# Patient Record
Sex: FEMALE | Race: WHITE | NOT HISPANIC OR LATINO | URBAN - METROPOLITAN AREA
[De-identification: names, ages, dates, MRNs, and addresses within clinical notes are randomized per-mention and may not be internally consistent; named-entity substitution may affect disease eponyms.]

---

## 2023-07-11 ENCOUNTER — HOSPITAL ENCOUNTER (OUTPATIENT)
Dept: DATA CONVERSION | Facility: HOSPITAL | Age: 88
End: 2023-07-21
Attending: PHYSICAL MEDICINE & REHABILITATION

## 2023-07-12 LAB
ANION GAP IN SER/PLAS: 13 MMOL/L (ref 10–20)
ANION GAP IN SER/PLAS: NORMAL
CALCIUM (MG/DL) IN SER/PLAS: 8.6 MG/DL (ref 8.6–10.3)
CALCIUM (MG/DL) IN SER/PLAS: NORMAL
CARBON DIOXIDE, TOTAL (MMOL/L) IN SER/PLAS: 30 MMOL/L (ref 21–32)
CARBON DIOXIDE, TOTAL (MMOL/L) IN SER/PLAS: NORMAL
CHLORIDE (MMOL/L) IN SER/PLAS: 104 MMOL/L (ref 98–107)
CHLORIDE (MMOL/L) IN SER/PLAS: NORMAL
CREATININE (MG/DL) IN SER/PLAS: 1.5 MG/DL (ref 0.5–1.05)
CREATININE (MG/DL) IN SER/PLAS: NORMAL
ERYTHROCYTE DISTRIBUTION WIDTH (RATIO) BY AUTOMATED COUNT: 13.4 % (ref 11.5–14.5)
ERYTHROCYTE DISTRIBUTION WIDTH (RATIO) BY AUTOMATED COUNT: 13.4 % (ref 11.5–14.5)
ERYTHROCYTE DISTRIBUTION WIDTH (RATIO) BY AUTOMATED COUNT: NORMAL
ERYTHROCYTE MEAN CORPUSCULAR HEMOGLOBIN CONCENTRATION (G/DL) BY AUTOMATED: 31.5 G/DL (ref 32–36)
ERYTHROCYTE MEAN CORPUSCULAR HEMOGLOBIN CONCENTRATION (G/DL) BY AUTOMATED: 31.5 G/DL (ref 32–36)
ERYTHROCYTE MEAN CORPUSCULAR HEMOGLOBIN CONCENTRATION (G/DL) BY AUTOMATED: NORMAL
ERYTHROCYTE MEAN CORPUSCULAR VOLUME (FL) BY AUTOMATED COUNT: 100 FL (ref 80–100)
ERYTHROCYTE MEAN CORPUSCULAR VOLUME (FL) BY AUTOMATED COUNT: 100 FL (ref 80–100)
ERYTHROCYTE MEAN CORPUSCULAR VOLUME (FL) BY AUTOMATED COUNT: NORMAL
ERYTHROCYTES (10*6/UL) IN BLOOD BY AUTOMATED COUNT: 3.39 X10E12/L (ref 4–5.2)
ERYTHROCYTES (10*6/UL) IN BLOOD BY AUTOMATED COUNT: 3.39 X10E12/L (ref 4–5.2)
ERYTHROCYTES (10*6/UL) IN BLOOD BY AUTOMATED COUNT: NORMAL
GFR FEMALE: 33 ML/MIN/1.73M2
GFR FEMALE: NORMAL
GFR MALE: NORMAL
GLUCOSE (MG/DL) IN SER/PLAS: 82 MG/DL (ref 74–99)
GLUCOSE (MG/DL) IN SER/PLAS: NORMAL
HEMATOCRIT (%) IN BLOOD BY AUTOMATED COUNT: 34 % (ref 36–46)
HEMATOCRIT (%) IN BLOOD BY AUTOMATED COUNT: 34 % (ref 36–46)
HEMATOCRIT (%) IN BLOOD BY AUTOMATED COUNT: NORMAL
HEMOGLOBIN (G/DL) IN BLOOD: 10.7 G/DL (ref 12–16)
HEMOGLOBIN (G/DL) IN BLOOD: 10.7 G/DL (ref 12–16)
HEMOGLOBIN (G/DL) IN BLOOD: NORMAL
INR IN PPP BY COAGULATION ASSAY: 2.9 (ref 0.9–1.1)
LEUKOCYTES (10*3/UL) IN BLOOD BY AUTOMATED COUNT: 9.3 X10E9/L (ref 4.4–11.3)
LEUKOCYTES (10*3/UL) IN BLOOD BY AUTOMATED COUNT: 9.3 X10E9/L (ref 4.4–11.3)
LEUKOCYTES (10*3/UL) IN BLOOD BY AUTOMATED COUNT: NORMAL
NRBC (PER 100 WBCS) BY AUTOMATED COUNT: NORMAL
PLATELETS (10*3/UL) IN BLOOD AUTOMATED COUNT: 169 X10E9/L (ref 150–450)
PLATELETS (10*3/UL) IN BLOOD AUTOMATED COUNT: 169 X10E9/L (ref 150–450)
PLATELETS (10*3/UL) IN BLOOD AUTOMATED COUNT: NORMAL
POTASSIUM (MMOL/L) IN SER/PLAS: 4.2 MMOL/L (ref 3.5–5.3)
POTASSIUM (MMOL/L) IN SER/PLAS: NORMAL
PROTHROMBIN TIME (PT) IN PPP BY COAGULATION ASSAY: 32.7 SEC (ref 9.8–12.8)
SODIUM (MMOL/L) IN SER/PLAS: 143 MMOL/L (ref 136–145)
SODIUM (MMOL/L) IN SER/PLAS: NORMAL
UREA NITROGEN (MG/DL) IN SER/PLAS: 47 MG/DL (ref 6–23)
UREA NITROGEN (MG/DL) IN SER/PLAS: NORMAL

## 2023-07-17 LAB
ANION GAP IN SER/PLAS: 11 MMOL/L (ref 10–20)
BASOPHILS (10*3/UL) IN BLOOD BY AUTOMATED COUNT: 0.04 X10E9/L (ref 0–0.1)
BASOPHILS/100 LEUKOCYTES IN BLOOD BY AUTOMATED COUNT: 0.7 % (ref 0–2)
CALCIUM (MG/DL) IN SER/PLAS: 8.5 MG/DL (ref 8.6–10.3)
CARBON DIOXIDE, TOTAL (MMOL/L) IN SER/PLAS: 31 MMOL/L (ref 21–32)
CHLORIDE (MMOL/L) IN SER/PLAS: 105 MMOL/L (ref 98–107)
CREATININE (MG/DL) IN SER/PLAS: 1.67 MG/DL (ref 0.5–1.05)
EOSINOPHILS (10*3/UL) IN BLOOD BY AUTOMATED COUNT: 0.18 X10E9/L (ref 0–0.4)
EOSINOPHILS/100 LEUKOCYTES IN BLOOD BY AUTOMATED COUNT: 3 % (ref 0–6)
ERYTHROCYTE DISTRIBUTION WIDTH (RATIO) BY AUTOMATED COUNT: 13.3 % (ref 11.5–14.5)
ERYTHROCYTE MEAN CORPUSCULAR HEMOGLOBIN CONCENTRATION (G/DL) BY AUTOMATED: 30.5 G/DL (ref 32–36)
ERYTHROCYTE MEAN CORPUSCULAR VOLUME (FL) BY AUTOMATED COUNT: 104 FL (ref 80–100)
ERYTHROCYTES (10*6/UL) IN BLOOD BY AUTOMATED COUNT: 3 X10E12/L (ref 4–5.2)
GFR FEMALE: 29 ML/MIN/1.73M2
GLUCOSE (MG/DL) IN SER/PLAS: 81 MG/DL (ref 74–99)
HEMATOCRIT (%) IN BLOOD BY AUTOMATED COUNT: 31.1 % (ref 36–46)
HEMOGLOBIN (G/DL) IN BLOOD: 9.5 G/DL (ref 12–16)
IMMATURE GRANULOCYTES/100 LEUKOCYTES IN BLOOD BY AUTOMATED COUNT: 1 % (ref 0–0.9)
INR IN PPP BY COAGULATION ASSAY: 2.4 (ref 0.9–1.1)
LEUKOCYTES (10*3/UL) IN BLOOD BY AUTOMATED COUNT: 6 X10E9/L (ref 4.4–11.3)
LYMPHOCYTES (10*3/UL) IN BLOOD BY AUTOMATED COUNT: 0.78 X10E9/L (ref 0.8–3)
LYMPHOCYTES/100 LEUKOCYTES IN BLOOD BY AUTOMATED COUNT: 13.1 % (ref 13–44)
MONOCYTES (10*3/UL) IN BLOOD BY AUTOMATED COUNT: 0.77 X10E9/L (ref 0.05–0.8)
MONOCYTES/100 LEUKOCYTES IN BLOOD BY AUTOMATED COUNT: 12.9 % (ref 2–10)
NEUTROPHILS (10*3/UL) IN BLOOD BY AUTOMATED COUNT: 4.13 X10E9/L (ref 1.6–5.5)
NEUTROPHILS/100 LEUKOCYTES IN BLOOD BY AUTOMATED COUNT: 69.3 % (ref 40–80)
PLATELETS (10*3/UL) IN BLOOD AUTOMATED COUNT: 202 X10E9/L (ref 150–450)
POTASSIUM (MMOL/L) IN SER/PLAS: 4 MMOL/L (ref 3.5–5.3)
PROTHROMBIN TIME (PT) IN PPP BY COAGULATION ASSAY: 27.5 SEC (ref 9.8–12.8)
SODIUM (MMOL/L) IN SER/PLAS: 143 MMOL/L (ref 136–145)
UREA NITROGEN (MG/DL) IN SER/PLAS: 50 MG/DL (ref 6–23)

## 2025-01-08 ENCOUNTER — EVALUATION (OUTPATIENT)
Dept: OCCUPATIONAL THERAPY | Facility: CLINIC | Age: OVER 89
End: 2025-01-08
Payer: MEDICARE

## 2025-01-08 DIAGNOSIS — I89.0 LYMPHEDEMA, NOT ELSEWHERE CLASSIFIED: ICD-10-CM

## 2025-01-08 DIAGNOSIS — R29.898 LEG HEAVINESS: ICD-10-CM

## 2025-01-08 DIAGNOSIS — I89.0 LYMPHEDEMA OF BOTH LOWER EXTREMITIES: ICD-10-CM

## 2025-01-08 DIAGNOSIS — M25.671 ANKLE JOINT STIFFNESS, BILATERAL: Primary | ICD-10-CM

## 2025-01-08 DIAGNOSIS — R53.1 WEAKNESS GENERALIZED: ICD-10-CM

## 2025-01-08 DIAGNOSIS — M25.672 ANKLE JOINT STIFFNESS, BILATERAL: Primary | ICD-10-CM

## 2025-01-08 PROCEDURE — 97166 OT EVAL MOD COMPLEX 45 MIN: CPT | Mod: GO | Performed by: OCCUPATIONAL THERAPIST

## 2025-01-08 PROCEDURE — 97535 SELF CARE MNGMENT TRAINING: CPT | Mod: GO | Performed by: OCCUPATIONAL THERAPIST

## 2025-01-08 ASSESSMENT — ACTIVITIES OF DAILY LIVING (ADL): HOME_MANAGEMENT_TIME_ENTRY: 8

## 2025-01-08 NOTE — PROGRESS NOTES
Occupational Therapy    Evaluation    Patient Name: Amanda Fang  MRN: [id number]  Today's Date: 1/9/2025       Visit: 1    Assessment:   Amanda Fang presents to occupational therapy with complaint of BLE lymphedema all limiting independence with ADLs, functional mobility, IADLs, and leisure activities. Standardized testing and measures administered today reveal that the patient has multiple impairments in body structures and functions, activity limitations, and participation restrictions. The patient has personal factors and comorbidities that may serve as barriers affecting the plan of care, including reliance on transportation and history of nephrectomy. Skilled OT services are warranted in order to realize measurable change in the above outcome measures and achieve improvements in patient's functional status and individual goals. Pt verbalized understanding and is in agreement with goals and plan of care.     Plan:      1x/wk for 8-12 weeks   -Complete Decongestive Therapy to BLEs   -Manual Therapy, Therapeutic Exercise, Self Care/ADL      Subjective   Current Problem:  1. Ankle joint stiffness, bilateral        2. Lymphedema, not elsewhere classified  Referral to Occupational Therapy      3. Leg heaviness        4. Lymphedema of both lower extremities        5. Weakness generalized          Patient arrives to OT with chief complaint of BLE lymphedema, joint stiffness, skin texture changes and generalized weakness all impacting independence with ADLs, IADLs, functional mobility, work and leisure tasks.     Pt reports onset of BLEs swelling ~5 years ago, however has fluctuated throughout the years.  Patient reports swelling was worse last summer resulting in hospitalization.  During the hospitalization pt had diuresis legs and swelling improved at that time.   More recently, leg swelling has gradually worsened with intermittent weeping.   Pt associates pain with swelling in BLEs; describes pain as heavy  "and achy.  Patient reports swelling improves with elevation and worsens  with prolonged sitting or standing. Patient has not received lymphedema therapy in the past.  Patient does wear velcro compression garment intermittently at this time.  Pt's family has also wrapped legs with Ace bandages which was somewhat beneficial.      Pt lives with daughter in single story home   Pt ambulates with use of FWW     Per ED visit in July 2023:   Patient is a 89-year-old female with past medical history of previous nephrectomy, CHF who presents to the emergency department day due to concern for lower extremity edema, concern for cellulitis, and shortness of breath.  Patient has reportedly had  increased weight gain over the past week.  She also has had increased lower extremity edema and swelling.  They are also concerned that patient has a cellulitis on her right lower extremity.  They were seen by their outpatient provider Dr. Cabrera, who  started patient on acyclovir and Keflex for concern for possible shingles versus cellulitis of right lower extremity.  They do think that the leg is worsened somewhat.  Take patient denies any fevers, cough, chills, chest pain, abdominal pain.  She does  report some shortness of breath.     ROS patient agrees with cardiology statements for Malaise, ; Dry Cough, Shortness of Breath; Dyspnea on Exertion; Musculoskeletal Pain, Stiffness, Weakness,  Anxiety, Sleep Changes, Rash, cold intolerance, anemia. The cardiologist was partially correct  with \"fever\" as daughter tells me mom felt warm prior to ER, yet not since that lone episode, adding no temperature has been elevated (first check was in ER). Also, blur vision may mean \"wears glasses\" because today she states no eye issues.         During her hospitalization, she received treatment for fluid overflow. Consultation to nephrology was appreciated. Diuresis was more aggressive than what could occur as outpatient, in part to the extra " "monitoring of vitals, symptoms and labs when in the  hospital as opposed to at home.      -lymphedema: she has had this for years, but recently it has worsened. \"Acute\" is likely better referencing the \"aggressiveness of treatment\" and not the \"time of onset\" for her issue. The referral to lymphedema clinic will continue post-hospital, as  planned. Nephrology and I are in agreement that as an inpatient she may be fluid-positive with aggressive diuresis, and will leave the hospital less swollen. The risks for removing the fluid are mitigable in the hospital, safer than as outpatient treatment.     -\"acute\" diastolic heart failure with volume overload. Cardiologist defers to nephrologist about compression hose, about diuresis. No concerning cardiac issues at this time. Cardiology signs off. Please call or reconsult if any problems or concerns.   -CAD: Hx angioplasty to coronary artery (long ago) is proof for such. Dr Galindo is her usual cardiologist, and he does not attend at this institution. Dr Ford kindly saw the patient.   - atrial fib is noted as rate-controlled.   -anticoagulation with warfarin ([facility] coumadin clinic monitoring when not in hospital).  Labs showed supra-therapeutic INR. Will supplement accordingly.   -hyperlipidemia and takes statin daily.   -rash uncertain cause, and resolved.  -hx unilateral nephrectomy.   -Stage IV CRF: three times in 12 months that the outpatient labs have creatinine clearance estimated in the 25-30 cc/min range.  creatinine level has been 1.8 to 1.9 four of last five times since Sept 2021. Dr. Horn has in person spoken to me about  the advantages of in-patient treatment She is now allowed aggressive diuresis. There is better supervision with nursing checking in on her to ask for side effect of treatment or new symptoms, with labs available on-demand, and with vitals performed multiple  times daily.     Precautions:   No history of wounds of DVT    CHF and nephrectomy "     Pain:   3/10 at rest   7/10 at worst     Objective       Lymphedema Assessment    LE Skin Appearance/Condition and Girth:   R Superior border of patella (SBP) 53.5  R 10cm above SBP  R 20cm above SBP  R 10cm below SBP 45.7  R 20cm below SBP 48.0  R 30cm below SBP 38.2  R 35cm below SBP 31.8  R Ankle lobule 32.5  R Ankle 29.4  R Forefoot 22.7    L Superior border of patella (SBP) 53.3  L10cm above SBP  L 20cm above SBP  L 10cm below SBP 46.7  L 20cm below SBP 49.9  L 30cm below SBP 41.1  L 35cm below SBP 35.9  L Ankle lobule 34.0   L Ankle  30.7  L Forefoot 23.8     Lower Extremity Evaluation:   BLE full and firm tissue texture    Mild fullness at feet    Hyperpigmentation throughout    Dryness     No wounds or weeping    RLE lymphedema <LLE     Outcome Measures:  LLIS: 28    OP EDUCATION:   OT educated pt on anatomy / physiology of the lymphatic system.  OT educated pt on complete decongestive therapy (CDT) for lymphedema treatment. OT developed goals and plan of care with patient.     OT applied short stretch bandages to BLEs from ankles to knee as follows:   -4” stockinette base layer, 1-12cm short stretch bandage. - Comfortable fit achieved.  OT educated pt and pt's daughter on donning techniques and benefits of short stretch bandages. All expressed carryover   OT reviewed post bandaging precautions, handout issued. -Pt expressed teach back of education     Goals:  -Demonstrate decreased swelling and softened tissue texture in BLEs upon visual inspection and palpation to increase safe functional mobility, ADLS. IADLS, work and leisure activities.  -Decrease circumferential measurements lower extremity by .5cm to 2.0cm.  -Demonstrate increased knowledge with lymphedema skin care precautions to reduce the risk of infection and exacerbation.  -Demonstrate independence with the self manual lymph drainage (MLD) to enhance lymphatic flow and decongestion of trunk and B lower extremities.  -Demonstrate independence  with self bandaging/compression techniques and independent understanding of the principles and theory of lymphatic compression.  -Be fit with and demonstrate good tolerance to B lower extremity compression garment when the patient is stable, at maximal decongestion.  -Demonstrate independence with donning/doffing garment and adherence to wear schedule, adaptive techniques as needed.  -Improve LLIS score by 10 points by discharge.  -Decrease pain, tightness lower extremities.  -Improve bilateral LE functional ROM and strength as needed for safe functional mobility.  -Demonstrate independence with home exercise program and compliance with lymphedema exercise precautions.

## 2025-01-09 PROBLEM — I89.0 LYMPHEDEMA OF BOTH LOWER EXTREMITIES: Status: ACTIVE | Noted: 2025-01-09

## 2025-01-09 PROBLEM — M25.671 ANKLE JOINT STIFFNESS, BILATERAL: Status: ACTIVE | Noted: 2025-01-09

## 2025-01-09 PROBLEM — M25.672 ANKLE JOINT STIFFNESS, BILATERAL: Status: ACTIVE | Noted: 2025-01-09

## 2025-01-09 PROBLEM — R29.898 LEG HEAVINESS: Status: ACTIVE | Noted: 2025-01-09

## 2025-01-09 PROBLEM — R53.1 WEAKNESS GENERALIZED: Status: ACTIVE | Noted: 2025-01-09

## 2025-02-10 ENCOUNTER — TREATMENT (OUTPATIENT)
Dept: OCCUPATIONAL THERAPY | Facility: CLINIC | Age: OVER 89
End: 2025-02-10
Payer: MEDICARE

## 2025-02-10 DIAGNOSIS — I89.0 LYMPHEDEMA OF BOTH LOWER EXTREMITIES: ICD-10-CM

## 2025-02-10 DIAGNOSIS — R29.898 LEG HEAVINESS: ICD-10-CM

## 2025-02-10 DIAGNOSIS — R53.1 WEAKNESS GENERALIZED: ICD-10-CM

## 2025-02-10 DIAGNOSIS — M25.672 ANKLE JOINT STIFFNESS, BILATERAL: Primary | ICD-10-CM

## 2025-02-10 DIAGNOSIS — M25.671 ANKLE JOINT STIFFNESS, BILATERAL: Primary | ICD-10-CM

## 2025-02-10 PROCEDURE — 97140 MANUAL THERAPY 1/> REGIONS: CPT | Mod: GO

## 2025-02-10 PROCEDURE — 97535 SELF CARE MNGMENT TRAINING: CPT | Mod: GO

## 2025-02-10 ASSESSMENT — ACTIVITIES OF DAILY LIVING (ADL): HOME_MANAGEMENT_TIME_ENTRY: 40

## 2025-02-10 NOTE — PROGRESS NOTES
"Occupational Therapy    Occupational Therapy Treatment    Name: Amanda Fang  MRN: [id number]  : 1933  Date: 02/10/25    Time Entry:  Time Calculation  Start Time: 0900  Stop Time: 955  Time Calculation (min): 55 min        OT Therapeutic Procedures Time Entry  Manual Therapy Time Entry: 15  Self Care/Home Management (ADLs) Time Entry: 40              Visit #2    Assessment:  OT initiate MLD (manual lymph drainage).  BLE tissue texture softening post tx.   OT instruct compression strategies, applied velcro compression garments.  Good fit noted.  Instruction to pt, her son and her grand daughter.     Plan:   1x/wk for 8-12 weeks   -Complete Decongestive Therapy to BLEs   -Manual Therapy, Therapeutic Exercise, Self Care/ADL     1. Ankle joint stiffness, bilateral        2. Leg heaviness        3. Lymphedema of both lower extremities        4. Weakness generalized            Subjective   General:   Pt has not returned since initial eval, had abdominal procedures (doyle), and 2 other procedures.  Pt reports feeling better.     Precautions:   No history of wounds of DVT    CHF and nephrectomy   Pt fell, bruised her right eye, \"First fall in 5 years\".    Pain Assessment:   Leg pain, sensitivity.    Objective    Lower Extremity:   BLE full and firm tissue texture   Full tight ankles.   Mild fullness at feet    Hyperpigmentation throughout    Dryness     No wounds or weeping    RLE lymphedema <LLE   Indents noted from compression stocking creases.    LE Measurements   R LE cm  Superior border of patella (SBP) 53.8  10cm above SBP -  10cm below SBP 40.8  20cm below SBP 37.2  30cm below SBP 28.7  35cm below SBP 25.8  Ankle lobule 28.2  Ankle 25.2  Forefoot 21.2    L LE cm  Superior border of patella (SBP) 52.5  10cm above SBP -  10cm below SBP 39.7  20cm below SBP 39.5  30cm below SBP 35.3  35cm below SBP 30.4  Ankle lobule 30.8  Ankle  27.0  Forefoot 21.3  Decreases since initial eval  2025     Treatment: 55 " minutes    Manual Therapy 15  OT initiate MLD, modified trunk, BLE sequence.   Good initial tolerance.  Will further instruct.    Self Care 40  OT assess skin, take and assess measurements. Improvements noted.  Pt has been wrapping with short stretch wraps with assist of her daughter, reports difficulty with wraps sliding.  OT instruct, issue size G tg  to wear over wraps to secure position. Good fit. OT instruct positioning.  Pt did not bring wraps today, will try this at home.  OT instruct pt to bring wraps to next session for assessment.  OT apply mild compression knee high sock liners. With family encouragement, OT apply calf Ready Wraps, instruct positioning and donning techniques to pt, son.  Good fit noted. Pt to wear as tolerated today. OT to assess.

## 2025-02-20 ENCOUNTER — TREATMENT (OUTPATIENT)
Dept: OCCUPATIONAL THERAPY | Facility: CLINIC | Age: OVER 89
End: 2025-02-20
Payer: MEDICARE

## 2025-02-20 DIAGNOSIS — R53.1 WEAKNESS GENERALIZED: ICD-10-CM

## 2025-02-20 DIAGNOSIS — M25.672 ANKLE JOINT STIFFNESS, BILATERAL: Primary | ICD-10-CM

## 2025-02-20 DIAGNOSIS — I89.0 LYMPHEDEMA OF BOTH LOWER EXTREMITIES: ICD-10-CM

## 2025-02-20 DIAGNOSIS — M25.671 ANKLE JOINT STIFFNESS, BILATERAL: Primary | ICD-10-CM

## 2025-02-20 DIAGNOSIS — R29.898 LEG HEAVINESS: ICD-10-CM

## 2025-02-20 PROCEDURE — 97535 SELF CARE MNGMENT TRAINING: CPT | Mod: GO

## 2025-02-20 PROCEDURE — 97140 MANUAL THERAPY 1/> REGIONS: CPT | Mod: GO

## 2025-02-20 ASSESSMENT — ACTIVITIES OF DAILY LIVING (ADL): HOME_MANAGEMENT_TIME_ENTRY: 30

## 2025-02-20 NOTE — PROGRESS NOTES
"Occupational Therapy    Occupational Therapy Treatment    Name: Amanda Fang  MRN: [id number]  : 1933  Date: 25    Time Entry:  Time Calculation  Start Time: 1102  Stop Time: 1159  Time Calculation (min): 57 min        OT Therapeutic Procedures Time Entry  Manual Therapy Time Entry: 27  Self Care/Home Management (ADLs) Time Entry: 30              Visit #3    Assessment:  OT provide MLD, instruct sequence and techniques.  Good pt tolerance.  Written home program issued.  Instruction to pt and her daughter.  OT wraps legs with modifications. Good fit noted.     Plan:   1x/wk for 8-12 weeks   -Complete Decongestive Therapy to BLEs   -Manual Therapy, Therapeutic Exercise, Self Care/ADL       1. Ankle joint stiffness, bilateral        2. Leg heaviness        3. Lymphedema of both lower extremities        4. Weakness generalized              Subjective   General:  Pt and her daughter reported tg  over wraps were too tight.     Precautions:   No history of wounds of DVT    CHF and nephrectomy history   Pt fell, bruised her right eye, \"First fall in 5 years\". Bruise improving.    Pain Assessment:   Leg pain, sensitivity.    Objective    Lower Extremity:   BLE full and firm tissue texture   Full tight ankles.   Mild fullness at feet    Hyperpigmentation throughout    Dryness     No wounds or weeping    L>R    LE Measurements   Measurements not taken.    Treatment: 57 minutes    Manual Therapy 27  OT provide MLD (manual lymph drainage), modified trunk, BLE sequence.   Sequence: 1-abdominal breathing, 2 clear axilla LN s, 3 clear axilla LN s, clear trunk, then BLEs.  Good pt tolerance, left ant shin sensitive to touch.  Leg softening noted post tx.    Self Care 30  OT assess skin, tissue texture.  OT develop, instruct manual sequence, instruct rationale of MLD, instruct/review precautions.  OT develop written HEP, issue to pt and her daughter.  Pt to start MLD 1-2x/day, stop if SOB.  Will assess, " modify/upgrade.  OT wrap pt lower legs, instruct wrapping techniques to pt and her daughter.  OT apply stockinette base layer, OT add 1-10 cm short stretch wrap per leg, ankle to below knee (OT change from 12 cm to 10 cm wrap).  Good fit noted.  Pt reported her legs felt good with wraps.

## 2025-02-27 ENCOUNTER — TREATMENT (OUTPATIENT)
Dept: OCCUPATIONAL THERAPY | Facility: CLINIC | Age: OVER 89
End: 2025-02-27
Payer: MEDICARE

## 2025-02-27 DIAGNOSIS — I89.0 LYMPHEDEMA OF BOTH LOWER EXTREMITIES: ICD-10-CM

## 2025-02-27 DIAGNOSIS — R53.1 WEAKNESS GENERALIZED: ICD-10-CM

## 2025-02-27 DIAGNOSIS — M25.672 ANKLE JOINT STIFFNESS, BILATERAL: Primary | ICD-10-CM

## 2025-02-27 DIAGNOSIS — M25.671 ANKLE JOINT STIFFNESS, BILATERAL: Primary | ICD-10-CM

## 2025-02-27 DIAGNOSIS — R29.898 LEG HEAVINESS: ICD-10-CM

## 2025-02-27 PROCEDURE — 97535 SELF CARE MNGMENT TRAINING: CPT | Mod: GO

## 2025-02-27 PROCEDURE — 97110 THERAPEUTIC EXERCISES: CPT | Mod: GO

## 2025-02-27 PROCEDURE — 97140 MANUAL THERAPY 1/> REGIONS: CPT | Mod: GO

## 2025-02-27 ASSESSMENT — ACTIVITIES OF DAILY LIVING (ADL): HOME_MANAGEMENT_TIME_ENTRY: 15

## 2025-02-27 NOTE — PROGRESS NOTES
Occupational Therapy    Occupational Therapy Treatment    Name: Amanda Fang  MRN: [id number]  : 1933  Date: 25    Time Entry:  Time Calculation  Start Time: 0900  Stop Time: 958  Time Calculation (min): 58 min        OT Therapeutic Procedures Time Entry  Manual Therapy Time Entry: 28  Self Care/Home Management (ADLs) Time Entry: 15  Therapeutic Exercise Time Entry: 15              Visit #4    Assessment:  OT provide MLD, modified trunk, BLEs.  BLE tissue texture softening post tx.  Written home program issued.  Instruction to pt and her daughter.  OT wraps legs with modifications. Good fit noted.     Plan:  Continue OT for BLE lymphedema CDT - complete decongestive therapy, decrease swelling, improve ease of  functional mobility, ADLS, IADLS.  Instruct home management.   1x/wk for 8-12 weeks   -Complete Decongestive Therapy to BLEs   -Manual Therapy, Therapeutic Exercise, Self Care/ADL       1. Ankle joint stiffness, bilateral        2. Leg heaviness        3. Lymphedema of both lower extremities        4. Weakness generalized              Subjective   General:  Pt and her daughter note pt legs swell more when not wearing wraps at night.  Pt sees her kidney doctor today.     Precautions:   No history of wounds of DVT    CHF and nephrectomy history       Objective    Lower Extremity:   BLE full and firm tissue texture   Full tight ankles.   Mild fullness at feet    Hyperpigmentation throughout    Dryness improving     No wounds or weeping    L>R    LE Measurements   R LE cm  Superior border of patella (SBP) 49.1  10cm above SBP -  10cm below SBP 39.8  20cm below SBP 37.1  30cm below SBP 33.0  35cm below SBP 28.8  Ankle lobule 28.1  Ankle 26.4  Forefoot 21.1    L LE cm  Superior border of patella (SBP) 47.8  10cm above SBP -  10cm below SBP 39.0  20cm below SBP 40.6  30cm below SBP 35.8  35cm below SBP 31.4  Ankle lobule 31.1  Ankle  27.8  Forefoot 21.7  Increases distal lower legs.  Decreases since  initial evaluation 1/8/2025.    Treatment: 58 minutes    Manual Therapy 28  OT provide MLD (manual lymph drainage), modified trunk, BLE sequence.   Sequence: 1-abdominal breathing, 2 clear axilla LN s, 3 clear axilla LN s, clear trunk, then BLEs.  Light pressure left lower leg, sensitive to touch.  BLE tissue texture softening post tx.  Will consider pump. Pt and her daughter will discuss pump use with kidney doctor today.    Therapeutic Exercise 15  OT instruct BLE AROM/decongestive exercises:  Ankle pump, ankle circles, knee extensions, ankle alphabet.  Pt fatigue with ex's, especially knee ext and ankle alphabet.  OT reinforce low reps and rest breaks.  Written handout issued.    Self Care 15  OT assess skin, tissue texture, take and assess tissue texture.  Decreases since initial evaluation.  Legs less dry, tissue texture softening.  OT refine MLD, instruct rational and importance of abdominal breathing and trunk clearing  to support lymphatic circulation.  Pt doing her MLD daily.  OT wrap pt lower legs (ankles to feet), apply stockinette base layer, 1-10 cm short stretch wrap.  Good fit noted.

## 2025-03-04 ENCOUNTER — TREATMENT (OUTPATIENT)
Dept: OCCUPATIONAL THERAPY | Facility: CLINIC | Age: OVER 89
End: 2025-03-04
Payer: MEDICARE

## 2025-03-04 DIAGNOSIS — R53.1 WEAKNESS GENERALIZED: ICD-10-CM

## 2025-03-04 DIAGNOSIS — I89.0 LYMPHEDEMA OF BOTH LOWER EXTREMITIES: ICD-10-CM

## 2025-03-04 DIAGNOSIS — R29.898 LEG HEAVINESS: ICD-10-CM

## 2025-03-04 DIAGNOSIS — M25.672 ANKLE JOINT STIFFNESS, BILATERAL: Primary | ICD-10-CM

## 2025-03-04 DIAGNOSIS — M25.671 ANKLE JOINT STIFFNESS, BILATERAL: Primary | ICD-10-CM

## 2025-03-04 PROCEDURE — 97140 MANUAL THERAPY 1/> REGIONS: CPT | Mod: GO

## 2025-03-04 PROCEDURE — 97535 SELF CARE MNGMENT TRAINING: CPT | Mod: GO

## 2025-03-04 ASSESSMENT — ACTIVITIES OF DAILY LIVING (ADL): HOME_MANAGEMENT_TIME_ENTRY: 25

## 2025-03-04 NOTE — PROGRESS NOTES
"Occupational Therapy    Occupational Therapy Treatment    Name: Amanda Fang  MRN: [id number]  : 1933  Date: 25    Time Entry:  Time Calculation  Start Time: 1056  Stop Time: 1149  Time Calculation (min): 53 min        OT Therapeutic Procedures Time Entry  Manual Therapy Time Entry: 28  Self Care/Home Management (ADLs) Time Entry: 25                Visit #5    Assessment:  OT provide MLD, modified trunk, BLEs.  BLE tissue texture softening post tx.  OT applied short stretch wraps, good fit noted.     Plan:  Continue OT for BLE lymphedema CDT - complete decongestive therapy, decrease swelling, improve ease of  functional mobility, ADLS, IADLS.  Instruct home management.   1x/wk for 8-12 weeks   -Complete Decongestive Therapy to BLEs   -Manual Therapy, Therapeutic Exercise, Self Care/ADL       1. Ankle joint stiffness, bilateral        2. Leg heaviness        3. Lymphedema of both lower extremities        4. Weakness generalized              Subjective   General:  Pt and her daughter note pt legs swell more when not wearing wraps at night.  Pt saw her kidney doctor, reported he approved pump use. Will assess with modified use.     Precautions:   No history of wounds of DVT    CHF and nephrectomy history     Pain  Pt deny pain. Legs heavy and tight \"at times\"    Objective    Lower Extremity:   BLE full and firm tissue texture -softening noted.  Full tight ankles.   Mild fullness at feet    Hyperpigmentation throughout    Dryness improving     No wounds or weeping    L>R    LE Measurements   R LE cm  Superior border of patella (SBP) 51.7  10cm above SBP -  10cm below SBP 40.4  20cm below SBP 35.4  30cm below SBP 25.7 -indent from wraps  35cm below SBP 25.3  -indent from wraps  Ankle lobule 27.6  Ankle 23.9  Forefoot 21.4    L LE cm  Superior border of patella (SBP) 49.6  10cm above SBP -  10cm below SBP 41.4  20cm below SBP 43.7  30cm below SBP 31.8  -indent  35cm below SBP 27.3  -indent  Ankle lobule " 29.1  Ankle  25.2  Forefoot 21.7  Fluctuations with indents from wraps that migrated down legs.  Decreases since initial evaluation 1/8/2025.    Treatment:   53 minutes    Manual Therapy 28  OT provide MLD (manual lymph drainage), modified trunk, BLE sequence.   Sequence: 1-abdominal breathing, 2 clear axilla LN s, 3 clear axilla LN s, clear trunk, then BLEs.  Good pt tolerance.  BLE tissue texture softening post tx.  Will consider pump, low pressure, modified, with family training. Per pt, kidney physician approved.    Self Care 25  OT assess skin, tissue texture, take and assess tissue texture.  Decreases since initial evaluation.  Legs less dry, tissue texture softening.  Indents from wraps that slid down.  Pt has left wraps on for extra days, her daughter who wraps her legs is out of town until this Saturday.  If wraps slide, OT recommend removing.    OT wrap pt lower legs (ankles to feet), apply stockinette base layer, 1-10 cm short stretch wrap.  Good fit noted.

## 2025-03-11 ENCOUNTER — TREATMENT (OUTPATIENT)
Dept: OCCUPATIONAL THERAPY | Facility: CLINIC | Age: OVER 89
End: 2025-03-11
Payer: MEDICARE

## 2025-03-11 DIAGNOSIS — R29.898 LEG HEAVINESS: ICD-10-CM

## 2025-03-11 DIAGNOSIS — M25.672 ANKLE JOINT STIFFNESS, BILATERAL: Primary | ICD-10-CM

## 2025-03-11 DIAGNOSIS — I89.0 LYMPHEDEMA OF BOTH LOWER EXTREMITIES: ICD-10-CM

## 2025-03-11 DIAGNOSIS — M25.671 ANKLE JOINT STIFFNESS, BILATERAL: Primary | ICD-10-CM

## 2025-03-11 DIAGNOSIS — R53.1 WEAKNESS GENERALIZED: ICD-10-CM

## 2025-03-11 PROCEDURE — 97535 SELF CARE MNGMENT TRAINING: CPT | Mod: GO | Performed by: OCCUPATIONAL THERAPIST

## 2025-03-11 PROCEDURE — 97140 MANUAL THERAPY 1/> REGIONS: CPT | Mod: GO | Performed by: OCCUPATIONAL THERAPIST

## 2025-03-11 ASSESSMENT — ACTIVITIES OF DAILY LIVING (ADL): HOME_MANAGEMENT_TIME_ENTRY: 33

## 2025-03-11 NOTE — PROGRESS NOTES
"Occupational Therapy    Occupational Therapy Treatment    Name: Amanda Fang  MRN: [id number]  : 1933  Date: 25    Time Entry:       Visit #5    Assessment:  OT trialed use of lymphapress to LLE at 25 mmHg on today's date.  Pt expressed comfort with lymphapress donned.  OT will initiated process to obtain for home use.      Plan:  Continue OT for BLE lymphedema CDT - complete decongestive therapy, decrease swelling, improve ease of  functional mobility, ADLS, IADLS.  Instruct home management.   1x/wk for 8-12 weeks   -Complete Decongestive Therapy to BLEs   -Manual Therapy, Therapeutic Exercise, Self Care/ADL       1. Ankle joint stiffness, bilateral        2. Leg heaviness        3. Lymphedema of both lower extremities        4. Weakness generalized          Subjective   General:  Pt reports her legs have improved since starting therapy.      Precautions:   No history of wounds of DVT    CHF and nephrectomy history     Pain  Pt deny pain. Legs heavy and tight \"at times\"    Objective    Lower Extremity:   BLE full and firm tissue texture -softening noted.  Full tight ankles.   Mild fullness at feet    Hyperpigmentation throughout    Dryness improving     No wounds or weeping    L>R    LE Measurements   No measurements taken on today's date     Treatment:   58 minutes    Manual Therapy 25  OT trialed lymphapress on today's date.  OT educated pt on benefits, completion schedule and precautions with device.    OT applied lymphapress to LLE at 25mmHg. Simultaneously, OT initiated MLD sequence with diaphragmatic breathing and opening of lymph nodes to promote lymphatic circulation.   OT provided MLD to B trunk and RLE. Softened tissue texture noted post tx.     Self Care 33  OT doffed short stretch bandages from BLES at start of treatment   OT assessed skin   OT educated pt on transition to velcro compression once BLEs at maximum decongestion.  OT showed pt and pt's dtr sample of L&R velcro compression " garment.  OT demonstrated donning techniques of garment.  Pt's dtr reports this garment is very similar to one she already owns.  Pt to bring in next session to be assessed.     OT applied bandages to BLES post MLD treatment:   -applied stockinette base layer, 1-10 cm short stretch wrap from ankle to knee, and TG  (size G) over top of bandages to secure bandages in place.   Good fit noted.    Pt continues to wear wraps daily or compression garments daily   Pt continues Manual lymph drainage (MLD) daily.  Pt elevates B LE while sleeping in night and as able throughout the day.   Pt states completes issued exercises at home  *Despite completion of these exercises, swelling in BLEs remains.

## 2025-04-01 ENCOUNTER — TREATMENT (OUTPATIENT)
Dept: OCCUPATIONAL THERAPY | Facility: CLINIC | Age: OVER 89
End: 2025-04-01
Payer: MEDICARE

## 2025-04-01 DIAGNOSIS — I89.0 LYMPHEDEMA OF BOTH LOWER EXTREMITIES: ICD-10-CM

## 2025-04-01 DIAGNOSIS — R29.898 LEG HEAVINESS: ICD-10-CM

## 2025-04-01 DIAGNOSIS — M25.672 ANKLE JOINT STIFFNESS, BILATERAL: Primary | ICD-10-CM

## 2025-04-01 DIAGNOSIS — R53.1 WEAKNESS GENERALIZED: ICD-10-CM

## 2025-04-01 DIAGNOSIS — M25.671 ANKLE JOINT STIFFNESS, BILATERAL: Primary | ICD-10-CM

## 2025-04-01 PROCEDURE — 97140 MANUAL THERAPY 1/> REGIONS: CPT | Mod: GO

## 2025-04-01 PROCEDURE — 97535 SELF CARE MNGMENT TRAINING: CPT | Mod: GO

## 2025-04-01 ASSESSMENT — ACTIVITIES OF DAILY LIVING (ADL): HOME_MANAGEMENT_TIME_ENTRY: 40

## 2025-04-01 NOTE — PROGRESS NOTES
"Occupational Therapy    Occupational Therapy Treatment    Name: Amanda Fang  MRN: [id number]  : 1933  Date: 2025    Time Entry:  Time Calculation  Start Time: 1300  Stop Time: 1359  Time Calculation (min): 59 min        OT Therapeutic Procedures Time Entry  Manual Therapy Time Entry: 19  Self Care/Home Management (ADLs) Time Entry: 40              Visit #7    Assessment:  Pt received new lower leg velcro compression garments, good fit noted.  BLE tissue texture softening post MLD (manual lymph drainage).  Pt would benefit from pump for home management, it is difficult for her to reach to her lower legs.  Pt follows compression, MLD, exercise and elevation daily.  Despite compliance with home programs/management, significant  symptoms of lymphedema remain.     Plan:  Continue OT 1x/week for BLE lymphedema CDT - complete decongestive therapy, decrease swelling, improve ease of  functional mobility, ADLS, IADLS.  Instruct home management.   -Complete Decongestive Therapy to BLEs   -Manual Therapy, Therapeutic Exercise, Self Care/ADL       1. Ankle joint stiffness, bilateral        2. Leg heaviness        3. Lymphedema of both lower extremities        4. Weakness generalized          Subjective   General:  Pt and her daughter report pt is having home trial/fitting for Lymphapress pump at home 4/3/25.     Precautions:   No history of wounds of DVT    CHF and nephrectomy history     Pain  Pt deny pain. Legs heavy and tight \"at times\"  Left lower leg sensitive to touch/pressure.    Objective    Lower Extremity:   BLE full and firm tissue texture -softening noted.  Full tight ankles.   Mild fullness at feet    Hyperpigmentation throughout    Dryness improving     No wounds or weeping    L>R    LE Measurements   R LE cm  Superior border of patella (SBP) 49.2  10cm above SBP -  10cm below SBP 39.2  20cm below SBP 34.7  30cm below SBP 28.1  35cm below SBP 24.8  Ankle lobule 25.6  Ankle 24.2  Forefoot 20.7    L " LE cm  Superior border of patella (SBP) 48.2  10cm above SBP -  10cm below SBP 39.6  20cm below SBP 36.2  30cm below SBP 32.7  35cm below SBP 29.3  Ankle lobule 28.3  Ankle  27.0  Forefoot 21.0  Decreases since last measurements 3/4/2025    Treatment:   59 minutes    Manual Therapy 19  OT provide MLD, clear trunk. Pt demonstrate good ability with trunk clearing, refinement added.  OT provide MLD BLEs, caution left lower leg due to sensitive.  Gentle soft tissue techniques with MLD.  Tissue texture softening post tx BLEs.    Self Care 40  OT assess skin, take and assess measurements.  Pt brought new lower leg non elastic velcro compression garments (Ready Wraps) to session.  OT applied sock liners and Ready Wraps, instruct pressure donning technique, positioning, pressure. Good fit noted.   Pt trialed Lymphapress last session. OT further educate pt and her daughter on home pump use, pressure. Pt tolerated 25 mmHg pressure last session, pump can go up to 40 mmHg.  Will further assess.  Pt has trial, fitting in her home 4/3.    Pt continues to wear wraps daily or compression garments daily   Pt continues manual lymph drainage (MLD) daily.   Pt elevates B LE while sleeping in night and throughout the day.   Pt states completes issued exercises at home  *Despite completion of these exercises, swelling in BLEs remains.

## 2025-04-10 ENCOUNTER — TREATMENT (OUTPATIENT)
Dept: OCCUPATIONAL THERAPY | Facility: CLINIC | Age: OVER 89
End: 2025-04-10
Payer: MEDICARE

## 2025-04-10 DIAGNOSIS — M25.671 ANKLE JOINT STIFFNESS, BILATERAL: Primary | ICD-10-CM

## 2025-04-10 DIAGNOSIS — I89.0 LYMPHEDEMA OF BOTH LOWER EXTREMITIES: ICD-10-CM

## 2025-04-10 DIAGNOSIS — R29.898 LEG HEAVINESS: ICD-10-CM

## 2025-04-10 DIAGNOSIS — R53.1 WEAKNESS GENERALIZED: ICD-10-CM

## 2025-04-10 DIAGNOSIS — M25.672 ANKLE JOINT STIFFNESS, BILATERAL: Primary | ICD-10-CM

## 2025-04-10 PROCEDURE — 97140 MANUAL THERAPY 1/> REGIONS: CPT | Mod: GO | Performed by: OCCUPATIONAL THERAPIST

## 2025-04-10 PROCEDURE — 97535 SELF CARE MNGMENT TRAINING: CPT | Mod: GO | Performed by: OCCUPATIONAL THERAPIST

## 2025-04-10 ASSESSMENT — ACTIVITIES OF DAILY LIVING (ADL): HOME_MANAGEMENT_TIME_ENTRY: 43

## 2025-04-10 NOTE — PROGRESS NOTES
"Occupational Therapy    Occupational Therapy Treatment    Name: Amanda Fang  MRN: [id number]  : 1933  Date: 2025    Time Entry:    Visit #8    Assessment:  OT reviewed donning strategies for velcro compression stockings.  Pt demonstrated ability to independently don compression.  Will further assess.     Plan:  Continue OT 1x/week for BLE lymphedema CDT - complete decongestive therapy, decrease swelling, improve ease of  functional mobility, ADLS, IADLS.  Instruct home management.   -Complete Decongestive Therapy to BLEs   -Manual Therapy, Therapeutic Exercise, Self Care/ADL       1. Ankle joint stiffness, bilateral        2. Leg heaviness        3. Lymphedema of both lower extremities        4. Weakness generalized          Subjective   General:  Pt and her daughter report they had the in home trial with lymphapress and it went well     Precautions:   No history of wounds of DVT    CHF and nephrectomy history     Pain  Pt deny pain. Legs heavy and tight \"at times\"  Left lower leg sensitive to touch/pressure.    Objective    Lower Extremity:   BLE full and firm tissue texture -softening noted.  Full tight ankles.   Mild fullness at feet    Hyperpigmentation throughout    Dryness improving     No wounds or weeping    L>R    LE Measurements   R LE cm  Superior border of patella (SBP) 49.2  10cm above SBP -  10cm below SBP 39.2  20cm below SBP 34.7  30cm below SBP 28.1  35cm below SBP 24.8  Ankle lobule 25.6  Ankle 24.2  Forefoot 20.7    L LE cm  Superior border of patella (SBP) 48.2  10cm above SBP -  10cm below SBP 39.6  20cm below SBP 36.2  30cm below SBP 32.7  35cm below SBP 29.3  Ankle lobule 28.3  Ankle  27.0  Forefoot 21.0  Decreases since last measurements 3/4/2025    Treatment:   58 minutes    Manual Therapy 15  OT applied lymphapress to LLE at 25 mmHg.  While lymphapress applied, OT provided manual lymph drainage (MLD) to R trunk and RLE.  Softened tissue texture post treatment     Self Care " 43    Pt arrived with velcro compression wraps donned.  OT removed wraps for treatment.     OT assessed skin     Pt reports mild difficulty independently donning compression.  Pt reports it is challenging to orient herself to the garment.  OT labeled the top of the garment by putting a piece of tape on the inside of the garment; pt reports this is helpful.      OT then demonstrated and reviewed strategies to ease with donning the garments.   OT applied compression to RLE then had pt independently don compression velcro wraps to her LLE.  Pt demonstrated ability to complete     Pt received her lymphapress in the mail for home use.  OT educated pt on how to utilize device and completion schedule of pump.  Pt expressed carryover    -OT recommended pt set pump at 25mmHg for 30 mmHg.  Pt and pt's dtr in agreement

## 2025-04-16 ENCOUNTER — TREATMENT (OUTPATIENT)
Dept: OCCUPATIONAL THERAPY | Facility: CLINIC | Age: OVER 89
End: 2025-04-16
Payer: MEDICARE

## 2025-04-16 DIAGNOSIS — R29.898 LEG HEAVINESS: ICD-10-CM

## 2025-04-16 DIAGNOSIS — M25.671 ANKLE JOINT STIFFNESS, BILATERAL: Primary | ICD-10-CM

## 2025-04-16 DIAGNOSIS — I89.0 LYMPHEDEMA OF BOTH LOWER EXTREMITIES: ICD-10-CM

## 2025-04-16 DIAGNOSIS — R53.1 WEAKNESS GENERALIZED: ICD-10-CM

## 2025-04-16 DIAGNOSIS — M25.672 ANKLE JOINT STIFFNESS, BILATERAL: Primary | ICD-10-CM

## 2025-04-16 PROCEDURE — 97535 SELF CARE MNGMENT TRAINING: CPT | Mod: GO

## 2025-04-16 PROCEDURE — 97140 MANUAL THERAPY 1/> REGIONS: CPT | Mod: GO

## 2025-04-16 ASSESSMENT — ACTIVITIES OF DAILY LIVING (ADL): HOME_MANAGEMENT_TIME_ENTRY: 45

## 2025-04-16 NOTE — PROGRESS NOTES
"Occupational Therapy    Occupational Therapy Treatment    Name: Amanda Fang  MRN: [id number]  : 1933  Date: 2025    Time Entry:  Time Calculation  Start Time: 1103  Stop Time: 1158  Time Calculation (min): 55 min        OT Therapeutic Procedures Time Entry  Manual Therapy Time Entry: 10  Self Care/Home Management (ADLs) Time Entry: 45              Visit #9    Assessment:  OT provided manual lymph drainage (MLD) BLEs.  Tissue texture softening post tx.  Home compression strategies reviewed. Pt is adjusting to new lower leg velcro compression wraps, alternates with short stretch wraps.  Pt received her pump, to start using it.  OT to assess.       Plan:  Continue for BLE lymphedema CDT - (complete decongestive therapy), decrease swelling, improve ease of  functional mobility, ADLS, IADLS.  Instruct home management.  See pt in 3-4 weeks.   -Complete Decongestive Therapy to BLEs   -Manual Therapy, Therapeutic Exercise, Self Care/ADL       1. Ankle joint stiffness, bilateral        2. Leg heaviness        3. Lymphedema of both lower extremities        4. Weakness generalized          Subjective   General:  Pt and her son reports pt received her pump, has not been able to start use yet, to begin after holiday.    Precautions:   No history of wounds of DVT    CHF and nephrectomy history     Pain  Pt deny pain. Legs heavy and tight \"at times\"  Left lower leg sensitive to touch/pressure.    Objective    Lower Extremity:   BLE full and firm tissue texture -softening noted.  Full tight ankles.   Mild fullness at feet    Hyperpigmentation throughout, color improved.    Dryness improving     No wounds or weeping    L>R    LE Measurements   R LE cm  Superior border of patella (SBP) 48.3  10cm above SBP -  10cm below SBP 39.5  20cm below SBP 34.1  30cm below SBP 25.7  35cm below SBP 23.4  Ankle lobule 27.0  Ankle 23.2  Forefoot 21.7    L LE cm  Superior border of patella (SBP) 47.2  10cm above SBP -  10cm below " SBP 39.5  20cm below SBP 34.3  30cm below SBP 29.3  35cm below SBP 28.2  Ankle lobule 28.6  Ankle  26.6  Forefoot 21.8  Decreases noted.    Treatment:   55 minutes    Manual Therapy 10  OT applied lymphapress to BLE at 25 mmHg.    OT and pt clear trunk.  Pt shows good trunk clearing technique.  Softened tissue texture post treatment     Self Care 45    Pt arrived with short stretch wraps lower legs.  Pt alternates between wraps and velcro garments, wears compression daily.  OT removed wraps for treatment.     OT assessed skin, tissue texture, take and assess measurements.     Pt received her Lymphapress in the mail for home use.  OT educated pt on how to utilize device and completion schedule of pump.  Pt expressed carryover    -OT recommended pt set pump at 25mmHg for 30 mmHg.  Pt and pt's son in agreement     OT apply short stretch wraps BLEs (ankles to feet):  OT apply stockinette base layer, 1-10 cm short stretch wrap.    Pt alternates between velcro and short stretch wraps.  Will assist with transition.

## 2025-04-21 ENCOUNTER — APPOINTMENT (OUTPATIENT)
Dept: OCCUPATIONAL THERAPY | Facility: CLINIC | Age: OVER 89
End: 2025-04-21
Payer: MEDICARE

## 2025-04-21 DIAGNOSIS — M25.671 ANKLE JOINT STIFFNESS, BILATERAL: Primary | ICD-10-CM

## 2025-04-21 DIAGNOSIS — R53.1 WEAKNESS GENERALIZED: ICD-10-CM

## 2025-04-21 DIAGNOSIS — I89.0 LYMPHEDEMA OF BOTH LOWER EXTREMITIES: ICD-10-CM

## 2025-04-21 DIAGNOSIS — M25.672 ANKLE JOINT STIFFNESS, BILATERAL: Primary | ICD-10-CM

## 2025-04-21 DIAGNOSIS — R29.898 LEG HEAVINESS: ICD-10-CM

## 2025-04-28 ENCOUNTER — APPOINTMENT (OUTPATIENT)
Dept: OCCUPATIONAL THERAPY | Facility: CLINIC | Age: OVER 89
End: 2025-04-28
Payer: MEDICARE

## 2025-04-28 DIAGNOSIS — R29.898 LEG HEAVINESS: ICD-10-CM

## 2025-04-28 DIAGNOSIS — M25.671 ANKLE JOINT STIFFNESS, BILATERAL: Primary | ICD-10-CM

## 2025-04-28 DIAGNOSIS — R53.1 WEAKNESS GENERALIZED: ICD-10-CM

## 2025-04-28 DIAGNOSIS — I89.0 LYMPHEDEMA OF BOTH LOWER EXTREMITIES: ICD-10-CM

## 2025-04-28 DIAGNOSIS — M25.672 ANKLE JOINT STIFFNESS, BILATERAL: Primary | ICD-10-CM

## 2025-05-12 ENCOUNTER — APPOINTMENT (OUTPATIENT)
Dept: OCCUPATIONAL THERAPY | Facility: CLINIC | Age: OVER 89
End: 2025-05-12
Payer: MEDICARE

## 2025-05-12 DIAGNOSIS — I89.0 LYMPHEDEMA OF BOTH LOWER EXTREMITIES: ICD-10-CM

## 2025-05-12 DIAGNOSIS — R29.898 LEG HEAVINESS: ICD-10-CM

## 2025-05-12 DIAGNOSIS — R53.1 WEAKNESS GENERALIZED: ICD-10-CM

## 2025-05-12 DIAGNOSIS — M25.671 ANKLE JOINT STIFFNESS, BILATERAL: Primary | ICD-10-CM

## 2025-05-12 DIAGNOSIS — M25.672 ANKLE JOINT STIFFNESS, BILATERAL: Primary | ICD-10-CM

## 2025-05-20 ENCOUNTER — TREATMENT (OUTPATIENT)
Dept: OCCUPATIONAL THERAPY | Facility: CLINIC | Age: OVER 89
End: 2025-05-20
Payer: MEDICARE

## 2025-05-20 DIAGNOSIS — M25.672 ANKLE JOINT STIFFNESS, BILATERAL: Primary | ICD-10-CM

## 2025-05-20 DIAGNOSIS — R53.1 WEAKNESS GENERALIZED: ICD-10-CM

## 2025-05-20 DIAGNOSIS — M25.671 ANKLE JOINT STIFFNESS, BILATERAL: Primary | ICD-10-CM

## 2025-05-20 DIAGNOSIS — I89.0 LYMPHEDEMA OF BOTH LOWER EXTREMITIES: ICD-10-CM

## 2025-05-20 DIAGNOSIS — R29.898 LEG HEAVINESS: ICD-10-CM

## 2025-05-20 PROCEDURE — 97535 SELF CARE MNGMENT TRAINING: CPT | Mod: GO | Performed by: OCCUPATIONAL THERAPIST

## 2025-05-20 ASSESSMENT — ACTIVITIES OF DAILY LIVING (ADL): HOME_MANAGEMENT_TIME_ENTRY: 56

## 2025-05-20 NOTE — PROGRESS NOTES
"Occupational Therapy    Occupational Therapy Treatment    Name: Amanda Fang  MRN: [id number]  : 1933  Date: 2025       Visit #10    Assessment:  Pt has progressed well towards therapy goals with noted significant decreased in circumferential measurements in BLES since evaluation.  Pt demonstrates readiness to transition to home programs for lymphedema management.  OT instructed pt to follow up if needed; pt expressed carryover     Plan:  No follow ups scheduled at this time; pt instructed to follow up as needed       1. Ankle joint stiffness, bilateral        2. Leg heaviness        3. Lymphedema of both lower extremities        4. Weakness generalized          Subjective   General:  Pt and her daughter reports things have been going really well and her legs look great    Precautions:   No history of wounds of DVT    CHF and nephrectomy history     Pain  Pt deny pain. Legs heavy and tight \"at times\"  Left lower leg sensitive to touch/pressure.    Objective    Lower Extremity:   BLE full and firm tissue texture -softening noted.  Full tight ankles.   Mild fullness at feet    Hyperpigmentation throughout, color improved.    Dryness improving     No wounds or weeping    L>R    LE Measurements    R Superior border of patella (SBP) 53.5 > 46.0  R 10cm above SBP   R 20cm above SBP  R 10cm below SBP 45.7 > 37.6   R 20cm below SBP 48.0 > 32.5   R 30cm below SBP 38.2 > 23.2  R 35cm below SBP 31.8 > 22.0   R Ankle lobule 32.5 > 25.3   R Ankle 29.4 > 22.5   R Forefoot 22.7 > 21.0      L Superior border of patella (SBP) 53.3 > 45.5   L10cm above SBP  L 20cm above SBP  L 10cm below SBP 46.7 > 39.0  L 20cm below SBP 49.9 > 33.3   L 30cm below SBP 41.1 > 26.0   L 35cm below SBP 35.9 > 27.1  L Ankle lobule 34.0  > 29.2   L Ankle  30.7 > 27.1   L Forefoot 23.8 > 21.8     Significant decreases since evaluation     Treatment:   56 minutes      Self Care 56    Pt arrived with short stretch bandages applied to BLES   OT " doffed bandages  OT assessed skin and took circumferential measurements  OT compared measurements with those taken at evaluation; significant decreases noted   OT reviewed completion schedule of HEPs; pt expressed carryover     Patient with last appointment scheduled on today's date. OT discussed goals and plan of care with patient. Patient ready to transition to home programs management of lymphedema. OT reviewed HEPs; patient expressed carryover. OT instructed patient to follow as needed.      OT apply short stretch wraps BLEs (ankles to feet):  OT apply stockinette base layer, 1-10 cm short stretch wrap.    Pt alternates between velcro and short stretch wraps.